# Patient Record
Sex: FEMALE | Race: WHITE | NOT HISPANIC OR LATINO | Employment: UNEMPLOYED | URBAN - METROPOLITAN AREA
[De-identification: names, ages, dates, MRNs, and addresses within clinical notes are randomized per-mention and may not be internally consistent; named-entity substitution may affect disease eponyms.]

---

## 2024-08-01 ENCOUNTER — APPOINTMENT (OUTPATIENT)
Dept: RADIOLOGY | Facility: HOSPITAL | Age: 79
End: 2024-08-01
Payer: MEDICARE

## 2024-08-01 ENCOUNTER — HOSPITAL ENCOUNTER (EMERGENCY)
Facility: HOSPITAL | Age: 79
Discharge: HOME | End: 2024-08-01
Attending: STUDENT IN AN ORGANIZED HEALTH CARE EDUCATION/TRAINING PROGRAM
Payer: MEDICARE

## 2024-08-01 ENCOUNTER — APPOINTMENT (OUTPATIENT)
Dept: CARDIOLOGY | Facility: HOSPITAL | Age: 79
End: 2024-08-01
Payer: MEDICARE

## 2024-08-01 VITALS
DIASTOLIC BLOOD PRESSURE: 93 MMHG | TEMPERATURE: 97.8 F | SYSTOLIC BLOOD PRESSURE: 146 MMHG | BODY MASS INDEX: 28.93 KG/M2 | WEIGHT: 180 LBS | HEART RATE: 80 BPM | OXYGEN SATURATION: 96 % | HEIGHT: 66 IN | RESPIRATION RATE: 23 BRPM

## 2024-08-01 DIAGNOSIS — R79.89 ELEVATED SERUM CREATININE: ICD-10-CM

## 2024-08-01 DIAGNOSIS — R55 NEAR SYNCOPE: Primary | ICD-10-CM

## 2024-08-01 LAB
ALBUMIN SERPL-MCNC: 4.3 G/DL (ref 3.5–5)
ALP BLD-CCNC: 80 U/L (ref 35–125)
ALT SERPL-CCNC: 24 U/L (ref 5–40)
ANION GAP SERPL CALC-SCNC: 11 MMOL/L
APPEARANCE UR: CLEAR
AST SERPL-CCNC: 30 U/L (ref 5–40)
BASOPHILS # BLD AUTO: 0.04 X10*3/UL (ref 0–0.1)
BASOPHILS NFR BLD AUTO: 0.8 %
BILIRUB SERPL-MCNC: 0.9 MG/DL (ref 0.1–1.2)
BILIRUB UR STRIP.AUTO-MCNC: NEGATIVE MG/DL
BUN SERPL-MCNC: 17 MG/DL (ref 8–25)
CALCIUM SERPL-MCNC: 9.2 MG/DL (ref 8.5–10.4)
CHLORIDE SERPL-SCNC: 101 MMOL/L (ref 97–107)
CO2 SERPL-SCNC: 27 MMOL/L (ref 24–31)
COLOR UR: ABNORMAL
CREAT SERPL-MCNC: 1.2 MG/DL (ref 0.4–1.6)
EGFRCR SERPLBLD CKD-EPI 2021: 46 ML/MIN/1.73M*2
EOSINOPHIL # BLD AUTO: 0.05 X10*3/UL (ref 0–0.4)
EOSINOPHIL NFR BLD AUTO: 1 %
ERYTHROCYTE [DISTWIDTH] IN BLOOD BY AUTOMATED COUNT: 14.2 % (ref 11.5–14.5)
GLUCOSE SERPL-MCNC: 125 MG/DL (ref 65–99)
GLUCOSE UR STRIP.AUTO-MCNC: ABNORMAL MG/DL
HCT VFR BLD AUTO: 45 % (ref 36–46)
HGB BLD-MCNC: 14.8 G/DL (ref 12–16)
HYALINE CASTS #/AREA URNS AUTO: ABNORMAL /LPF
IMM GRANULOCYTES # BLD AUTO: 0.01 X10*3/UL (ref 0–0.5)
IMM GRANULOCYTES NFR BLD AUTO: 0.2 % (ref 0–0.9)
KETONES UR STRIP.AUTO-MCNC: NEGATIVE MG/DL
LEUKOCYTE ESTERASE UR QL STRIP.AUTO: NEGATIVE
LYMPHOCYTES # BLD AUTO: 0.94 X10*3/UL (ref 0.8–3)
LYMPHOCYTES NFR BLD AUTO: 19.7 %
MCH RBC QN AUTO: 31.6 PG (ref 26–34)
MCHC RBC AUTO-ENTMCNC: 32.9 G/DL (ref 32–36)
MCV RBC AUTO: 96 FL (ref 80–100)
MONOCYTES # BLD AUTO: 0.34 X10*3/UL (ref 0.05–0.8)
MONOCYTES NFR BLD AUTO: 7.1 %
MUCOUS THREADS #/AREA URNS AUTO: ABNORMAL /LPF
NEUTROPHILS # BLD AUTO: 3.4 X10*3/UL (ref 1.6–5.5)
NEUTROPHILS NFR BLD AUTO: 71.2 %
NITRITE UR QL STRIP.AUTO: NEGATIVE
NRBC BLD-RTO: 0 /100 WBCS (ref 0–0)
PH UR STRIP.AUTO: 6.5 [PH]
PLATELET # BLD AUTO: 186 X10*3/UL (ref 150–450)
POTASSIUM SERPL-SCNC: 3.8 MMOL/L (ref 3.4–5.1)
PROT SERPL-MCNC: 6.9 G/DL (ref 5.9–7.9)
PROT UR STRIP.AUTO-MCNC: ABNORMAL MG/DL
Q ONSET: 227 MS
QRS COUNT: 12 BEATS
QRS DURATION: 104 MS
QT INTERVAL: 360 MS
QTC CALCULATION(BAZETT): 385 MS
QTC FREDERICIA: 377 MS
R AXIS: -25 DEGREES
RBC # BLD AUTO: 4.69 X10*6/UL (ref 4–5.2)
RBC # UR STRIP.AUTO: NEGATIVE /UL
RBC #/AREA URNS AUTO: ABNORMAL /HPF
SODIUM SERPL-SCNC: 139 MMOL/L (ref 133–145)
SP GR UR STRIP.AUTO: 1.02
SQUAMOUS #/AREA URNS AUTO: ABNORMAL /HPF
T AXIS: 34 DEGREES
T OFFSET: 407 MS
UROBILINOGEN UR STRIP.AUTO-MCNC: NORMAL MG/DL
VENTRICULAR RATE: 69 BPM
WBC # BLD AUTO: 4.8 X10*3/UL (ref 4.4–11.3)
WBC #/AREA URNS AUTO: ABNORMAL /HPF

## 2024-08-01 PROCEDURE — 99285 EMERGENCY DEPT VISIT HI MDM: CPT | Mod: 25

## 2024-08-01 PROCEDURE — 85025 COMPLETE CBC W/AUTO DIFF WBC: CPT | Performed by: STUDENT IN AN ORGANIZED HEALTH CARE EDUCATION/TRAINING PROGRAM

## 2024-08-01 PROCEDURE — 70450 CT HEAD/BRAIN W/O DYE: CPT

## 2024-08-01 PROCEDURE — 93005 ELECTROCARDIOGRAM TRACING: CPT

## 2024-08-01 PROCEDURE — 36415 COLL VENOUS BLD VENIPUNCTURE: CPT | Performed by: STUDENT IN AN ORGANIZED HEALTH CARE EDUCATION/TRAINING PROGRAM

## 2024-08-01 PROCEDURE — 80053 COMPREHEN METABOLIC PANEL: CPT | Performed by: STUDENT IN AN ORGANIZED HEALTH CARE EDUCATION/TRAINING PROGRAM

## 2024-08-01 PROCEDURE — 70450 CT HEAD/BRAIN W/O DYE: CPT | Performed by: RADIOLOGY

## 2024-08-01 PROCEDURE — 96360 HYDRATION IV INFUSION INIT: CPT

## 2024-08-01 PROCEDURE — 2500000004 HC RX 250 GENERAL PHARMACY W/ HCPCS (ALT 636 FOR OP/ED): Performed by: STUDENT IN AN ORGANIZED HEALTH CARE EDUCATION/TRAINING PROGRAM

## 2024-08-01 PROCEDURE — 81003 URINALYSIS AUTO W/O SCOPE: CPT | Performed by: STUDENT IN AN ORGANIZED HEALTH CARE EDUCATION/TRAINING PROGRAM

## 2024-08-01 RX ORDER — POTASSIUM CHLORIDE 750 MG/1
10 TABLET, FILM COATED, EXTENDED RELEASE ORAL 2 TIMES DAILY
COMMUNITY

## 2024-08-01 RX ORDER — ATORVASTATIN CALCIUM 20 MG/1
20 TABLET, FILM COATED ORAL DAILY
COMMUNITY

## 2024-08-01 RX ORDER — LEVOTHYROXINE SODIUM 25 UG/1
25 TABLET ORAL DAILY
COMMUNITY

## 2024-08-01 RX ORDER — BUSPIRONE HYDROCHLORIDE 5 MG/1
TABLET ORAL 2 TIMES DAILY
COMMUNITY

## 2024-08-01 RX ORDER — LOSARTAN POTASSIUM 50 MG/1
50 TABLET ORAL DAILY
COMMUNITY

## 2024-08-01 RX ORDER — ASPIRIN 81 MG/1
81 TABLET ORAL DAILY
COMMUNITY

## 2024-08-01 RX ORDER — SERTRALINE HYDROCHLORIDE 25 MG/1
25 TABLET, FILM COATED ORAL DAILY
COMMUNITY

## 2024-08-01 RX ORDER — FAMOTIDINE 20 MG/1
TABLET, FILM COATED ORAL
COMMUNITY

## 2024-08-01 RX ORDER — AMIODARONE HYDROCHLORIDE 200 MG/1
TABLET ORAL DAILY
COMMUNITY

## 2024-08-01 RX ORDER — SUCRALFATE 1 G/10ML
1 SUSPENSION ORAL 2 TIMES DAILY
COMMUNITY

## 2024-08-01 RX ORDER — BUSPIRONE HYDROCHLORIDE 10 MG/1
10 TABLET ORAL NIGHTLY
COMMUNITY

## 2024-08-01 RX ORDER — DONEPEZIL HYDROCHLORIDE 10 MG/1
10 TABLET, ORALLY DISINTEGRATING ORAL NIGHTLY
COMMUNITY

## 2024-08-01 RX ADMIN — SODIUM CHLORIDE 1000 ML: 900 INJECTION, SOLUTION INTRAVENOUS at 14:30

## 2024-08-01 ASSESSMENT — LIFESTYLE VARIABLES
TOTAL SCORE: 0
HAVE PEOPLE ANNOYED YOU BY CRITICIZING YOUR DRINKING: NO
HAVE YOU EVER FELT YOU SHOULD CUT DOWN ON YOUR DRINKING: NO
EVER FELT BAD OR GUILTY ABOUT YOUR DRINKING: NO
EVER HAD A DRINK FIRST THING IN THE MORNING TO STEADY YOUR NERVES TO GET RID OF A HANGOVER: NO

## 2024-08-01 ASSESSMENT — PAIN SCALES - GENERAL
PAINLEVEL_OUTOF10: 0 - NO PAIN

## 2024-08-01 ASSESSMENT — COLUMBIA-SUICIDE SEVERITY RATING SCALE - C-SSRS
1. IN THE PAST MONTH, HAVE YOU WISHED YOU WERE DEAD OR WISHED YOU COULD GO TO SLEEP AND NOT WAKE UP?: NO
2. HAVE YOU ACTUALLY HAD ANY THOUGHTS OF KILLING YOURSELF?: NO
6. HAVE YOU EVER DONE ANYTHING, STARTED TO DO ANYTHING, OR PREPARED TO DO ANYTHING TO END YOUR LIFE?: NO

## 2024-08-01 ASSESSMENT — PAIN - FUNCTIONAL ASSESSMENT: PAIN_FUNCTIONAL_ASSESSMENT: 0-10

## 2024-08-01 NOTE — DISCHARGE INSTRUCTIONS
Follow-up with your primary care physician after your emergency department visit.  Return to the ED for reevaluation in case of any new or worsening symptoms like one-sided weakness, paralysis, one-sided numbness, sudden vision loss, facial droop, slurred speech, inability to speak all of which could be signs of stroke and require immediate reassessment by physician.

## 2024-08-01 NOTE — ED TRIAGE NOTES
Patient stated patient unable to stand today, he helped to the ground. Denies any fall.  Hx Vascular dementia.

## 2024-08-01 NOTE — ED PROVIDER NOTES
HPI   Chief Complaint   Patient presents with    Gait Problem     Unable to stand up on her own.   denies any fall, slid patient to the ground.  Hx Vascular dementia       This is a 79-year-old female with a past medical history of hypothyroidism, hypertension, anxiety and depression, atrial fibrillation on amiodarone and Xarelto who presents to the ED today for evaluation of transient generalized weakness/near syncopal episode.   states that she got up in the morning as usual, she had episode where she was feeling weak and lowered herself down to the ground with his help.  She felt like she was very weak and was not strong enough to continue walking which was unusual for her.   states that her voice got very quiet when she was speaking to him during the episode.  This lasted about 30 seconds to a minute before her symptoms passed.  She denies any associated palpitations, shortness of breath, chest pain.  She does have a history of dementia.  In the ED she states she has no ongoing symptoms and feels back to normal.  Her  states that she has never had an episode like this before and wanted to have her evaluated afterwards.      History provided by:  Patient and significant other   used: No            Patient History   Past Medical History:   Diagnosis Date    Atrial fibrillation (Multi)     MI (mitral incompetence)     Vascular dementia (Multi)      Past Surgical History:   Procedure Laterality Date    ANKLE FRACTURE SURGERY Left     CATARACT EXTRACTION Bilateral     CHOLECYSTECTOMY      LASIK      RETINAL DETACHMENT SURGERY Left      No family history on file.  Social History     Tobacco Use    Smoking status: Never    Smokeless tobacco: Never   Substance Use Topics    Alcohol use: Never    Drug use: Never       Physical Exam   ED Triage Vitals [08/01/24 0954]   Temperature Heart Rate Respirations BP   36.6 °C (97.8 °F) 80 16 (!) 156/99      Pulse Ox Temp Source Heart  Rate Source Patient Position   95 % Oral Monitor Sitting      BP Location FiO2 (%)     Left arm --       Physical Exam  General: well developed, well nourished elderly female who is awake and alert, oriented to self and place but not the date or year, in no apparent distress.   at bedside.  Eyes: sclera clear bilaterally, PERRL, EOMI  HENT: normocephalic, atraumatic. Pharynx without erythema or exudates, uvula midline.  No facial droop  CV: regular rate and rhythm, no murmur, no gallops, or rubs. radial and dorsalis pedis pulses +2/4 bilaterally  Resp: clear to ascultation bilaterally, no wheezes, rales, or rhonchi  GI: abdomen soft, nontender without rigidity or guarding, no peritoneal signs, abdomen is nondistended, no masses palpated  MSK: strength +5/5 to upper and lower extremities bilaterally, no swelling of the extremities.  Neuro: no focal deficits, CN2-12 intact. Sensation fully intact.  Unable to recall the month or day, NIHSS 1.  This is baseline per .  Psych: Pleasantly demented, cooperative with exam  Skin: warm, dry, without evidence of rash or abrasions    ED Course & MDM   ED Course as of 08/01/24 1650   Thu Aug 01, 2024   1005 EKG on my interpretation shows A-fib with rate of 69 beats minute.  Normal axis.  QTc is 35 ms.  There is no ST elevation or depression, no acute ischemic pattern.  No STEMI.   [NT]      ED Course User Index  [NT] Trent Moeller DO         Diagnoses as of 08/01/24 1650   Near syncope   Elevated serum creatinine                       [place] Coma Scale Score: 15                        Medical Decision Making  She is in no acute distress in the emergency department, she is awake and alert, oriented to self and place but not the year or day,  states this is not unusual for her given her dementia and she feels that she is at her baseline mental status.  A detailed neurologic exam was performed, she has no focal neurologic deficits, NIHSS is 1 due to  inability to recall the month,  states this is normal for her.     states that she has a history of UTIs and often gets confused and weak when she has a UTI, he feels this might be the cause of her symptoms today.  General medical workup was ordered as well as EKG to evaluate for evidence of acute ischemia or arrhythmia although the patient has no ongoing chest pain.  Her EKG does show that she is in A-fib consistent with her history, there is no acute ischemic pattern.  I do not suspect ACS given this finding and lack of any ongoing chest pain or shortness of breath to suggest cardiac ischemia.  Head CT shows no acute intracranial abnormality.  There are no significant electrolyte abnormalities, her creatinine is slightly elevated compared with the baseline suggestive of dehydration.  I did order IV fluids which she received  .  On reassessment the patient states that she feels completely back to normal,  states she is acting normally and has had no recurrence of her symptoms.  She has been able to get up and ambulate to and from the bathroom and ambulate with a walker in the ED.  She typically uses a cane at home.    Given her benign workup in the ED today we discussed the option of admission for observation and MRI testing if we consider the possibility that this may have been a TIA.  Overall the patient had no focal neurologic symptoms during the episode that were consistent with TIA, I feel this is unlikely.  The patient's  states that she really would prefer to go home and she gets very anxious in the hospital, if is not absolutely necessary, they would prefer that she is not admitted.  I feel this is reasonable given that she is back to her baseline and has no ongoing symptoms, and I have low suspicion for TIA today.  She is already on aspirin, Xarelto, a statin all of which would be continued in the setting of TIA.  She does have a follow-up appointment within the next 2 weeks  with her cardiologist.  This is shared decision making.  She was discharged in improved condition with no ongoing symptoms.  Return precautions were discussed including signs of acute coronary syndrome like chest pain or shortness of breath, any new focal neurologic deficit that would suggest stroke and may require admission to the hospital for further evaluation.    CT head wo IV contrast   Final Result   No acute intracranial abnormality. Consider follow-up with MRI as   warranted.             Signed by: Constance Barreto 8/1/2024 12:16 PM   Dictation workstation:   [id number]        Labs Reviewed   COMPREHENSIVE METABOLIC PANEL - Abnormal       Result Value    Glucose 125 (*)     Sodium 139      Potassium 3.8      Chloride 101      Bicarbonate 27      Urea Nitrogen 17      Creatinine 1.20      eGFR 46 (*)     Calcium 9.2      Albumin 4.3      Alkaline Phosphatase 80      Total Protein 6.9      AST 30      Bilirubin, Total 0.9      ALT 24      Anion Gap 11     URINALYSIS WITH REFLEX CULTURE AND MICROSCOPIC - Abnormal    Color, Urine Light-Yellow      Appearance, Urine Clear      Specific Gravity, Urine 1.017      pH, Urine 6.5      Protein, Urine 10 (TRACE)      Glucose, Urine 30 (TRACE) (*)     Blood, Urine NEGATIVE      Ketones, Urine NEGATIVE      Bilirubin, Urine NEGATIVE      Urobilinogen, Urine Normal      Nitrite, Urine NEGATIVE      Leukocyte Esterase, Urine NEGATIVE     URINALYSIS MICROSCOPIC WITH REFLEX CULTURE - Abnormal    WBC, Urine 1-5      RBC, Urine 1-2      Squamous Epithelial Cells, Urine 1-9 (SPARSE)      Mucus, Urine FEW      Hyaline Casts, Urine 3+ (*)    CBC WITH AUTO DIFFERENTIAL    WBC 4.8      nRBC 0.0      RBC 4.69      Hemoglobin 14.8      Hematocrit 45.0      MCV 96      MCH 31.6      MCHC 32.9      RDW 14.2      Platelets 186      Neutrophils % 71.2      Immature Granulocytes %, Automated 0.2      Lymphocytes % 19.7      Monocytes % 7.1      Eosinophils % 1.0      Basophils % 0.8       Neutrophils Absolute 3.40      Immature Granulocytes Absolute, Automated 0.01      Lymphocytes Absolute 0.94      Monocytes Absolute 0.34      Eosinophils Absolute 0.05      Basophils Absolute 0.04     URINALYSIS WITH REFLEX CULTURE AND MICROSCOPIC    Narrative:     The following orders were created for panel order Urinalysis with Reflex Culture and Microscopic.  Procedure                               Abnormality         Status                     ---------                               -----------         ------                     Urinalysis with Reflex C...[[id number]]  Abnormal            Final result               Extra Urine Gray Tube[[id number]]                            In process                   Please view results for these tests on the individual orders.   EXTRA URINE GRAY TUBE         Procedure  Procedures     Trent Moeller,   08/01/24 3080

## 2024-08-02 LAB — HOLD SPECIMEN: NORMAL

## 2024-09-05 ENCOUNTER — APPOINTMENT (OUTPATIENT)
Dept: RADIOLOGY | Facility: HOSPITAL | Age: 79
End: 2024-09-05
Payer: MEDICARE

## 2024-09-05 ENCOUNTER — APPOINTMENT (OUTPATIENT)
Dept: CARDIOLOGY | Facility: HOSPITAL | Age: 79
End: 2024-09-05
Payer: MEDICARE

## 2024-09-05 ENCOUNTER — HOSPITAL ENCOUNTER (INPATIENT)
Facility: HOSPITAL | Age: 79
LOS: 1 days | End: 2024-09-05
Attending: EMERGENCY MEDICINE | Admitting: INTERNAL MEDICINE
Payer: MEDICARE

## 2024-09-05 VITALS
OXYGEN SATURATION: 92 % | WEIGHT: 191.14 LBS | BODY MASS INDEX: 30.72 KG/M2 | HEIGHT: 66 IN | HEART RATE: 88 BPM | DIASTOLIC BLOOD PRESSURE: 52 MMHG | SYSTOLIC BLOOD PRESSURE: 61 MMHG | TEMPERATURE: 97.9 F | RESPIRATION RATE: 18 BRPM

## 2024-09-05 DIAGNOSIS — R57.1 HYPOVOLEMIC SHOCK (MULTI): Primary | ICD-10-CM

## 2024-09-05 DIAGNOSIS — T14.8XXA HEMATOMA: ICD-10-CM

## 2024-09-05 DIAGNOSIS — R00.1 BRADYCARDIA BY ELECTROCARDIOGRAM: ICD-10-CM

## 2024-09-05 DIAGNOSIS — R55 NEAR SYNCOPE: ICD-10-CM

## 2024-09-05 DIAGNOSIS — I97.630 POSTPROCEDURAL HEMATOMA OF A CIRCULATORY SYSTEM ORGAN OR STRUCTURE FOLLOWING A CARDIAC CATHETERIZATION: ICD-10-CM

## 2024-09-05 LAB
ABO GROUP (TYPE) IN BLOOD: NORMAL
ABO GROUP (TYPE) IN BLOOD: NORMAL
ALBUMIN SERPL-MCNC: 3.5 G/DL (ref 3.5–5)
ALP BLD-CCNC: 59 U/L (ref 35–125)
ALT SERPL-CCNC: 18 U/L (ref 5–40)
ANION GAP BLDV CALCULATED.4IONS-SCNC: 19 MMOL/L (ref 10–25)
ANION GAP SERPL CALC-SCNC: 10 MMOL/L
ANTIBODY SCREEN: NORMAL
AORTIC VALVE MEAN GRADIENT: 2.5 MMHG
AORTIC VALVE PEAK VELOCITY: 1.11 M/S
APTT PPP: 30.2 SECONDS (ref 22–32.5)
AST SERPL-CCNC: 24 U/L (ref 5–40)
AV PEAK GRADIENT: 5 MMHG
AVA (PEAK VEL): 1.9 CM2
AVA (VTI): 1.9 CM2
BASE EXCESS BLDV CALC-SCNC: -16.6 MMOL/L (ref -2–3)
BASOPHILS # BLD AUTO: 0.04 X10*3/UL (ref 0–0.1)
BASOPHILS NFR BLD AUTO: 0.5 %
BILIRUB SERPL-MCNC: 0.8 MG/DL (ref 0.1–1.2)
BLOOD EXPIRATION DATE: NORMAL
BODY TEMPERATURE: 37 DEGREES CELSIUS
BUN SERPL-MCNC: 26 MG/DL (ref 8–25)
CA-I BLDV-SCNC: 1.17 MMOL/L (ref 1.1–1.33)
CALCIUM SERPL-MCNC: 8.5 MG/DL (ref 8.5–10.4)
CHLORIDE BLDV-SCNC: 107 MMOL/L (ref 98–107)
CHLORIDE SERPL-SCNC: 107 MMOL/L (ref 97–107)
CO2 SERPL-SCNC: 24 MMOL/L (ref 24–31)
CREAT SERPL-MCNC: 1.4 MG/DL (ref 0.4–1.6)
DISPENSE STATUS: NORMAL
EGFRCR SERPLBLD CKD-EPI 2021: 39 ML/MIN/1.73M*2
EJECTION FRACTION APICAL 4 CHAMBER: 39
EJECTION FRACTION: 58 %
EOSINOPHIL # BLD AUTO: 0.04 X10*3/UL (ref 0–0.4)
EOSINOPHIL NFR BLD AUTO: 0.5 %
ERYTHROCYTE [DISTWIDTH] IN BLOOD BY AUTOMATED COUNT: 14.4 % (ref 11.5–14.5)
ERYTHROCYTE [DISTWIDTH] IN BLOOD BY AUTOMATED COUNT: 17.2 % (ref 11.5–14.5)
ERYTHROCYTE [DISTWIDTH] IN BLOOD BY AUTOMATED COUNT: 18.6 % (ref 11.5–14.5)
GLUCOSE BLDV-MCNC: 307 MG/DL (ref 74–99)
GLUCOSE SERPL-MCNC: 152 MG/DL (ref 65–99)
HCO3 BLDV-SCNC: 12.1 MMOL/L (ref 22–26)
HCT VFR BLD AUTO: 24.9 % (ref 36–46)
HCT VFR BLD AUTO: 32.6 % (ref 36–46)
HCT VFR BLD AUTO: 39.2 % (ref 36–46)
HCT VFR BLD EST: 36 % (ref 36–46)
HGB BLD-MCNC: 10.5 G/DL (ref 12–16)
HGB BLD-MCNC: 12.3 G/DL (ref 12–16)
HGB BLD-MCNC: 7.9 G/DL (ref 12–16)
HGB BLDV-MCNC: 12.1 G/DL (ref 12–16)
IMM GRANULOCYTES # BLD AUTO: 0.03 X10*3/UL (ref 0–0.5)
IMM GRANULOCYTES NFR BLD AUTO: 0.3 % (ref 0–0.9)
INHALED O2 CONCENTRATION: 32 %
INR PPP: 1.4 (ref 0.9–1.2)
LACTATE BLDV-SCNC: 9.4 MMOL/L (ref 0.4–2)
LEFT VENTRICLE INTERNAL DIMENSION DIASTOLE: 4.77 CM (ref 3.5–6)
LEFT VENTRICULAR OUTFLOW TRACT DIAMETER: 1.89 CM
LV EJECTION FRACTION BIPLANE: 40 %
LYMPHOCYTES # BLD AUTO: 3.68 X10*3/UL (ref 0.8–3)
LYMPHOCYTES NFR BLD AUTO: 41.7 %
MAGNESIUM SERPL-MCNC: 2 MG/DL (ref 1.6–3.1)
MCH RBC QN AUTO: 30 PG (ref 26–34)
MCH RBC QN AUTO: 31.4 PG (ref 26–34)
MCH RBC QN AUTO: 32.2 PG (ref 26–34)
MCHC RBC AUTO-ENTMCNC: 31.4 G/DL (ref 32–36)
MCHC RBC AUTO-ENTMCNC: 31.7 G/DL (ref 32–36)
MCHC RBC AUTO-ENTMCNC: 32.2 G/DL (ref 32–36)
MCV RBC AUTO: 100 FL (ref 80–100)
MCV RBC AUTO: 100 FL (ref 80–100)
MCV RBC AUTO: 95 FL (ref 80–100)
MITRAL VALVE E/A RATIO: 2.28
MONOCYTES # BLD AUTO: 0.93 X10*3/UL (ref 0.05–0.8)
MONOCYTES NFR BLD AUTO: 10.5 %
NEUTROPHILS # BLD AUTO: 4.1 X10*3/UL (ref 1.6–5.5)
NEUTROPHILS NFR BLD AUTO: 46.5 %
NRBC BLD-RTO: 0 /100 WBCS (ref 0–0)
OXYHGB MFR BLDV: 45.3 % (ref 45–75)
PCO2 BLDV: 38 MM HG (ref 41–51)
PH BLDV: 7.11 PH (ref 7.33–7.43)
PLATELET # BLD AUTO: 107 X10*3/UL (ref 150–450)
PLATELET # BLD AUTO: 108 X10*3/UL (ref 150–450)
PLATELET # BLD AUTO: 181 X10*3/UL (ref 150–450)
PO2 BLDV: 28 MM HG (ref 35–45)
POTASSIUM BLDV-SCNC: 4.2 MMOL/L (ref 3.5–5.3)
POTASSIUM SERPL-SCNC: 3.7 MMOL/L (ref 3.4–5.1)
PRODUCT BLOOD TYPE: 5100
PRODUCT BLOOD TYPE: 9500
PRODUCT CODE: NORMAL
PROT SERPL-MCNC: 5.4 G/DL (ref 5.9–7.9)
PROTHROMBIN TIME: 14.8 SECONDS (ref 9.3–12.7)
RBC # BLD AUTO: 2.63 X10*6/UL (ref 4–5.2)
RBC # BLD AUTO: 3.26 X10*6/UL (ref 4–5.2)
RBC # BLD AUTO: 3.92 X10*6/UL (ref 4–5.2)
RH FACTOR (ANTIGEN D): NORMAL
RH FACTOR (ANTIGEN D): NORMAL
RIGHT VENTRICLE PEAK SYSTOLIC PRESSURE: 21.5 MMHG
SAO2 % BLDV: 46 % (ref 45–75)
SODIUM BLDV-SCNC: 134 MMOL/L (ref 136–145)
SODIUM SERPL-SCNC: 141 MMOL/L (ref 133–145)
UNIT ABO: NORMAL
UNIT NUMBER: NORMAL
UNIT RH: NORMAL
UNIT VOLUME: 350
WBC # BLD AUTO: 11.5 X10*3/UL (ref 4.4–11.3)
WBC # BLD AUTO: 14.7 X10*3/UL (ref 4.4–11.3)
WBC # BLD AUTO: 8.8 X10*3/UL (ref 4.4–11.3)
XM INTEP: NORMAL

## 2024-09-05 PROCEDURE — 2500000004 HC RX 250 GENERAL PHARMACY W/ HCPCS (ALT 636 FOR OP/ED): Performed by: PHYSICIAN ASSISTANT

## 2024-09-05 PROCEDURE — 71045 X-RAY EXAM CHEST 1 VIEW: CPT

## 2024-09-05 PROCEDURE — 96374 THER/PROPH/DIAG INJ IV PUSH: CPT | Mod: 59

## 2024-09-05 PROCEDURE — 84132 ASSAY OF SERUM POTASSIUM: CPT | Performed by: NURSE PRACTITIONER

## 2024-09-05 PROCEDURE — 3E033XZ INTRODUCTION OF VASOPRESSOR INTO PERIPHERAL VEIN, PERCUTANEOUS APPROACH: ICD-10-PCS | Performed by: INTERNAL MEDICINE

## 2024-09-05 PROCEDURE — 82435 ASSAY OF BLOOD CHLORIDE: CPT | Performed by: NURSE PRACTITIONER

## 2024-09-05 PROCEDURE — 99223 1ST HOSP IP/OBS HIGH 75: CPT | Performed by: NURSE PRACTITIONER

## 2024-09-05 PROCEDURE — 71045 X-RAY EXAM CHEST 1 VIEW: CPT | Performed by: RADIOLOGY

## 2024-09-05 PROCEDURE — P9016 RBC LEUKOCYTES REDUCED: HCPCS

## 2024-09-05 PROCEDURE — 2550000001 HC RX 255 CONTRASTS: Performed by: EMERGENCY MEDICINE

## 2024-09-05 PROCEDURE — 85025 COMPLETE CBC W/AUTO DIFF WBC: CPT | Performed by: PHYSICIAN ASSISTANT

## 2024-09-05 PROCEDURE — 2500000004 HC RX 250 GENERAL PHARMACY W/ HCPCS (ALT 636 FOR OP/ED): Performed by: NURSE PRACTITIONER

## 2024-09-05 PROCEDURE — 6360000002 HC RX 636 FACTOR: Mod: JZ | Performed by: EMERGENCY MEDICINE

## 2024-09-05 PROCEDURE — 93306 TTE W/DOPPLER COMPLETE: CPT | Performed by: INTERNAL MEDICINE

## 2024-09-05 PROCEDURE — 99291 CRITICAL CARE FIRST HOUR: CPT | Performed by: NURSE PRACTITIONER

## 2024-09-05 PROCEDURE — 86920 COMPATIBILITY TEST SPIN: CPT

## 2024-09-05 PROCEDURE — 93005 ELECTROCARDIOGRAM TRACING: CPT

## 2024-09-05 PROCEDURE — 75635 CT ANGIO ABDOMINAL ARTERIES: CPT

## 2024-09-05 PROCEDURE — 99238 HOSP IP/OBS DSCHRG MGMT 30/<: CPT

## 2024-09-05 PROCEDURE — 85610 PROTHROMBIN TIME: CPT | Performed by: NURSE PRACTITIONER

## 2024-09-05 PROCEDURE — 99291 CRITICAL CARE FIRST HOUR: CPT | Mod: 25

## 2024-09-05 PROCEDURE — 96361 HYDRATE IV INFUSION ADD-ON: CPT | Mod: 59

## 2024-09-05 PROCEDURE — 83735 ASSAY OF MAGNESIUM: CPT | Performed by: PHYSICIAN ASSISTANT

## 2024-09-05 PROCEDURE — 2500000005 HC RX 250 GENERAL PHARMACY W/O HCPCS: Performed by: NURSE PRACTITIONER

## 2024-09-05 PROCEDURE — 96365 THER/PROPH/DIAG IV INF INIT: CPT | Mod: 59

## 2024-09-05 PROCEDURE — 93306 TTE W/DOPPLER COMPLETE: CPT

## 2024-09-05 PROCEDURE — 02HV33Z INSERTION OF INFUSION DEVICE INTO SUPERIOR VENA CAVA, PERCUTANEOUS APPROACH: ICD-10-PCS | Performed by: EMERGENCY MEDICINE

## 2024-09-05 PROCEDURE — 96375 TX/PRO/DX INJ NEW DRUG ADDON: CPT | Mod: 59

## 2024-09-05 PROCEDURE — 36430 TRANSFUSION BLD/BLD COMPNT: CPT

## 2024-09-05 PROCEDURE — 2020000001 HC ICU ROOM DAILY

## 2024-09-05 PROCEDURE — 86901 BLOOD TYPING SEROLOGIC RH(D): CPT | Performed by: PHYSICIAN ASSISTANT

## 2024-09-05 PROCEDURE — 99222 1ST HOSP IP/OBS MODERATE 55: CPT | Performed by: NURSE PRACTITIONER

## 2024-09-05 PROCEDURE — 36415 COLL VENOUS BLD VENIPUNCTURE: CPT | Performed by: PHYSICIAN ASSISTANT

## 2024-09-05 PROCEDURE — 36556 INSERT NON-TUNNEL CV CATH: CPT | Performed by: EMERGENCY MEDICINE

## 2024-09-05 PROCEDURE — 2500000005 HC RX 250 GENERAL PHARMACY W/O HCPCS: Performed by: EMERGENCY MEDICINE

## 2024-09-05 PROCEDURE — 36415 COLL VENOUS BLD VENIPUNCTURE: CPT | Performed by: EMERGENCY MEDICINE

## 2024-09-05 PROCEDURE — 80053 COMPREHEN METABOLIC PANEL: CPT | Performed by: PHYSICIAN ASSISTANT

## 2024-09-05 PROCEDURE — P9040 RBC LEUKOREDUCED IRRADIATED: HCPCS

## 2024-09-05 PROCEDURE — 2500000004 HC RX 250 GENERAL PHARMACY W/ HCPCS (ALT 636 FOR OP/ED)

## 2024-09-05 PROCEDURE — 85027 COMPLETE CBC AUTOMATED: CPT | Performed by: NURSE PRACTITIONER

## 2024-09-05 PROCEDURE — 75635 CT ANGIO ABDOMINAL ARTERIES: CPT | Performed by: RADIOLOGY

## 2024-09-05 PROCEDURE — 2500000004 HC RX 250 GENERAL PHARMACY W/ HCPCS (ALT 636 FOR OP/ED): Performed by: EMERGENCY MEDICINE

## 2024-09-05 RX ORDER — LEVOTHYROXINE SODIUM 25 UG/1
25 TABLET ORAL DAILY
Status: DISCONTINUED | OUTPATIENT
Start: 2024-09-06 | End: 2024-09-05

## 2024-09-05 RX ORDER — LIDOCAINE HYDROCHLORIDE 20 MG/ML
5 INJECTION, SOLUTION INFILTRATION; PERINEURAL ONCE
Status: COMPLETED | OUTPATIENT
Start: 2024-09-05 | End: 2024-09-05

## 2024-09-05 RX ORDER — LORAZEPAM 2 MG/ML
2 INJECTION INTRAMUSCULAR EVERY 10 MIN PRN
Status: DISCONTINUED | OUTPATIENT
Start: 2024-09-05 | End: 2024-09-06 | Stop reason: HOSPADM

## 2024-09-05 RX ORDER — BUSPIRONE HYDROCHLORIDE 5 MG/1
5 TABLET ORAL 2 TIMES DAILY
Status: DISCONTINUED | OUTPATIENT
Start: 2024-09-05 | End: 2024-09-05

## 2024-09-05 RX ORDER — ONDANSETRON HYDROCHLORIDE 2 MG/ML
4 INJECTION, SOLUTION INTRAVENOUS ONCE
Status: COMPLETED | OUTPATIENT
Start: 2024-09-05 | End: 2024-09-05

## 2024-09-05 RX ORDER — LORAZEPAM 2 MG/ML
0.5 INJECTION INTRAMUSCULAR EVERY 4 HOURS PRN
Status: DISCONTINUED | OUTPATIENT
Start: 2024-09-05 | End: 2024-09-06 | Stop reason: HOSPADM

## 2024-09-05 RX ORDER — EPINEPHRINE HCL IN DEXTROSE 5% 4MG/250ML
0-.2 PLASTIC BAG, INJECTION (ML) INTRAVENOUS CONTINUOUS
Status: DISCONTINUED | OUTPATIENT
Start: 2024-09-05 | End: 2024-09-05

## 2024-09-05 RX ORDER — ATROPINE SULFATE 1 MG/ML
0.5 INJECTION, SOLUTION INTRAVENOUS ONCE
Status: COMPLETED | OUTPATIENT
Start: 2024-09-05 | End: 2024-09-05

## 2024-09-05 RX ORDER — KETOROLAC TROMETHAMINE 30 MG/ML
15 INJECTION, SOLUTION INTRAMUSCULAR; INTRAVENOUS EVERY 6 HOURS PRN
Status: DISCONTINUED | OUTPATIENT
Start: 2024-09-05 | End: 2024-09-06 | Stop reason: HOSPADM

## 2024-09-05 RX ORDER — ASPIRIN 81 MG/1
81 TABLET ORAL DAILY
Status: DISCONTINUED | OUTPATIENT
Start: 2024-09-05 | End: 2024-09-05

## 2024-09-05 RX ORDER — ATORVASTATIN CALCIUM 20 MG/1
20 TABLET, FILM COATED ORAL NIGHTLY
Status: DISCONTINUED | OUTPATIENT
Start: 2024-09-05 | End: 2024-09-05

## 2024-09-05 RX ORDER — EPINEPHRINE HCL IN DEXTROSE 5% 4MG/250ML
0-.2 PLASTIC BAG, INJECTION (ML) INTRAVENOUS CONTINUOUS
Status: DISCONTINUED | OUTPATIENT
Start: 2024-09-05 | End: 2024-09-06 | Stop reason: HOSPADM

## 2024-09-05 RX ORDER — MORPHINE SULFATE 2 MG/ML
2 INJECTION, SOLUTION INTRAMUSCULAR; INTRAVENOUS
Status: DISCONTINUED | OUTPATIENT
Start: 2024-09-05 | End: 2024-09-06 | Stop reason: HOSPADM

## 2024-09-05 RX ORDER — GLYCOPYRROLATE 0.2 MG/ML
0.2 INJECTION INTRAMUSCULAR; INTRAVENOUS EVERY 4 HOURS PRN
Status: DISCONTINUED | OUTPATIENT
Start: 2024-09-05 | End: 2024-09-06 | Stop reason: HOSPADM

## 2024-09-05 RX ORDER — DEXTROSE 50 % IN WATER (D50W) INTRAVENOUS SYRINGE
12.5
Status: DISCONTINUED | OUTPATIENT
Start: 2024-09-05 | End: 2024-09-05

## 2024-09-05 RX ORDER — SUCRALFATE 1 G/10ML
1 SUSPENSION ORAL 2 TIMES DAILY
Status: DISCONTINUED | OUTPATIENT
Start: 2024-09-05 | End: 2024-09-05

## 2024-09-05 RX ORDER — HALOPERIDOL 5 MG/ML
1 INJECTION INTRAMUSCULAR EVERY 4 HOURS PRN
Status: DISCONTINUED | OUTPATIENT
Start: 2024-09-05 | End: 2024-09-06 | Stop reason: HOSPADM

## 2024-09-05 RX ORDER — POTASSIUM CHLORIDE 750 MG/1
10 TABLET, FILM COATED, EXTENDED RELEASE ORAL 2 TIMES DAILY
Status: DISCONTINUED | OUTPATIENT
Start: 2024-09-05 | End: 2024-09-06 | Stop reason: HOSPADM

## 2024-09-05 RX ORDER — MORPHINE SULFATE 4 MG/ML
4 INJECTION, SOLUTION INTRAMUSCULAR; INTRAVENOUS
Status: DISCONTINUED | OUTPATIENT
Start: 2024-09-05 | End: 2024-09-06 | Stop reason: HOSPADM

## 2024-09-05 RX ORDER — DONEPEZIL HYDROCHLORIDE 10 MG/1
10 TABLET, FILM COATED ORAL NIGHTLY
Status: DISCONTINUED | OUTPATIENT
Start: 2024-09-05 | End: 2024-09-05

## 2024-09-05 RX ORDER — LOSARTAN POTASSIUM 50 MG/1
50 TABLET ORAL DAILY
Status: DISCONTINUED | OUTPATIENT
Start: 2024-09-05 | End: 2024-09-05

## 2024-09-05 RX ORDER — KETAMINE HYDROCHLORIDE 50 MG/ML
0.25 INJECTION, SOLUTION INTRAMUSCULAR; INTRAVENOUS ONCE
Status: COMPLETED | OUTPATIENT
Start: 2024-09-05 | End: 2024-09-05

## 2024-09-05 RX ORDER — SERTRALINE HYDROCHLORIDE 50 MG/1
25 TABLET, FILM COATED ORAL DAILY
Status: DISCONTINUED | OUTPATIENT
Start: 2024-09-05 | End: 2024-09-05

## 2024-09-05 RX ORDER — FAMOTIDINE 20 MG/1
20 TABLET, FILM COATED ORAL DAILY
Status: DISCONTINUED | OUTPATIENT
Start: 2024-09-05 | End: 2024-09-05

## 2024-09-05 RX ORDER — BUSPIRONE HYDROCHLORIDE 10 MG/1
10 TABLET ORAL NIGHTLY
Status: DISCONTINUED | OUTPATIENT
Start: 2024-09-05 | End: 2024-09-05

## 2024-09-05 RX ORDER — INSULIN LISPRO 100 [IU]/ML
0-5 INJECTION, SOLUTION INTRAVENOUS; SUBCUTANEOUS
Status: DISCONTINUED | OUTPATIENT
Start: 2024-09-05 | End: 2024-09-05

## 2024-09-05 RX ORDER — SUCRALFATE 1 G/1
1 TABLET ORAL 2 TIMES DAILY
COMMUNITY
End: 2024-09-05 | Stop reason: HOSPADM

## 2024-09-05 RX ORDER — CHOLECALCIFEROL (VITAMIN D3) 125 MCG
125 CAPSULE ORAL DAILY
COMMUNITY
End: 2024-09-05 | Stop reason: HOSPADM

## 2024-09-05 RX ORDER — DEXTROSE 50 % IN WATER (D50W) INTRAVENOUS SYRINGE
25
Status: DISCONTINUED | OUTPATIENT
Start: 2024-09-05 | End: 2024-09-05

## 2024-09-05 RX ORDER — MORPHINE SULFATE/0.9% NACL/PF 1 MG/ML
0-10 PLASTIC BAG, INJECTION (ML) INTRAVENOUS CONTINUOUS
Status: DISCONTINUED | OUTPATIENT
Start: 2024-09-05 | End: 2024-09-06 | Stop reason: HOSPADM

## 2024-09-05 RX ORDER — AMIODARONE HYDROCHLORIDE 200 MG/1
200 TABLET ORAL DAILY
Status: DISCONTINUED | OUTPATIENT
Start: 2024-09-05 | End: 2024-09-05

## 2024-09-05 RX ORDER — PROCHLORPERAZINE EDISYLATE 5 MG/ML
10 INJECTION INTRAMUSCULAR; INTRAVENOUS ONCE
Status: COMPLETED | OUTPATIENT
Start: 2024-09-05 | End: 2024-09-05

## 2024-09-05 SDOH — SOCIAL STABILITY: SOCIAL INSECURITY: ARE YOU OR HAVE YOU BEEN THREATENED OR ABUSED PHYSICALLY, EMOTIONALLY, OR SEXUALLY BY ANYONE?: UNABLE TO ASSESS

## 2024-09-05 SDOH — SOCIAL STABILITY: SOCIAL INSECURITY: ABUSE: ADULT

## 2024-09-05 SDOH — SOCIAL STABILITY: SOCIAL INSECURITY: WERE YOU ABLE TO COMPLETE ALL THE BEHAVIORAL HEALTH SCREENINGS?: NO

## 2024-09-05 SDOH — SOCIAL STABILITY: SOCIAL INSECURITY: HAS ANYONE EVER THREATENED TO HURT YOUR FAMILY OR YOUR PETS?: UNABLE TO ASSESS

## 2024-09-05 SDOH — SOCIAL STABILITY: SOCIAL INSECURITY: HAVE YOU HAD THOUGHTS OF HARMING ANYONE ELSE?: UNABLE TO ASSESS

## 2024-09-05 SDOH — SOCIAL STABILITY: SOCIAL INSECURITY: DO YOU FEEL ANYONE HAS EXPLOITED OR TAKEN ADVANTAGE OF YOU FINANCIALLY OR OF YOUR PERSONAL PROPERTY?: UNABLE TO ASSESS

## 2024-09-05 SDOH — SOCIAL STABILITY: SOCIAL INSECURITY: DOES ANYONE TRY TO KEEP YOU FROM HAVING/CONTACTING OTHER FRIENDS OR DOING THINGS OUTSIDE YOUR HOME?: UNABLE TO ASSESS

## 2024-09-05 SDOH — SOCIAL STABILITY: SOCIAL INSECURITY: HAVE YOU HAD ANY THOUGHTS OF HARMING ANYONE ELSE?: UNABLE TO ASSESS

## 2024-09-05 SDOH — SOCIAL STABILITY: SOCIAL INSECURITY: ARE THERE ANY APPARENT SIGNS OF INJURIES/BEHAVIORS THAT COULD BE RELATED TO ABUSE/NEGLECT?: UNABLE TO ASSESS

## 2024-09-05 SDOH — SOCIAL STABILITY: SOCIAL INSECURITY: DO YOU FEEL UNSAFE GOING BACK TO THE PLACE WHERE YOU ARE LIVING?: UNABLE TO ASSESS

## 2024-09-05 SDOH — HEALTH STABILITY: MENTAL HEALTH: EXPERIENCED ANY OF THE FOLLOWING LIFE EVENTS: OTHER (COMMENT)

## 2024-09-05 ASSESSMENT — COGNITIVE AND FUNCTIONAL STATUS - GENERAL
CLIMB 3 TO 5 STEPS WITH RAILING: A LITTLE
PATIENT BASELINE BEDBOUND: NO
DAILY ACTIVITIY SCORE: 24
MOBILITY SCORE: 23

## 2024-09-05 ASSESSMENT — ACTIVITIES OF DAILY LIVING (ADL)
BATHING: INDEPENDENT
JUDGMENT_ADEQUATE_SAFELY_COMPLETE_DAILY_ACTIVITIES: NO
HEARING - RIGHT EAR: FUNCTIONAL
PATIENT'S MEMORY ADEQUATE TO SAFELY COMPLETE DAILY ACTIVITIES?: NO
WALKS IN HOME: INDEPENDENT
ADEQUATE_TO_COMPLETE_ADL: YES
TOILETING: INDEPENDENT
GROOMING: INDEPENDENT
LACK_OF_TRANSPORTATION: NO
FEEDING YOURSELF: INDEPENDENT
DRESSING YOURSELF: INDEPENDENT
HEARING - LEFT EAR: FUNCTIONAL

## 2024-09-05 ASSESSMENT — PAIN SCALES - GENERAL
PAINLEVEL_OUTOF10: 10 - WORST POSSIBLE PAIN
PAINLEVEL_OUTOF10: 7

## 2024-09-05 ASSESSMENT — COLUMBIA-SUICIDE SEVERITY RATING SCALE - C-SSRS
6. HAVE YOU EVER DONE ANYTHING, STARTED TO DO ANYTHING, OR PREPARED TO DO ANYTHING TO END YOUR LIFE?: NO
1. IN THE PAST MONTH, HAVE YOU WISHED YOU WERE DEAD OR WISHED YOU COULD GO TO SLEEP AND NOT WAKE UP?: NO
2. HAVE YOU ACTUALLY HAD ANY THOUGHTS OF KILLING YOURSELF?: NO

## 2024-09-05 ASSESSMENT — PAIN SCALES - PAIN ASSESSMENT IN ADVANCED DEMENTIA (PAINAD)
TOTALSCORE: 0
TOTALSCORE: EFFECTIVE
BREATHING: NORMAL
TOTALSCORE: MEDICATION (SEE MAR)
CONSOLABILITY: UNABLE TO CONSOLE, DISTRACT OR REASSURE
TOTALSCORE: 0
CONSOLABILITY: NO NEED TO CONSOLE
TOTALSCORE: 7
BREATHING: NOISY LABORED BREATHING, LONG PERIODS OF HYPERVENTILATION, CHEYNE-STOKES RESPIRATIONS
TOTALSCORE: EFFECTIVE
FACIALEXPRESSION: SMILING OR INEXPRESSIVE
BODYLANGUAGE: TENSE, DISTRESSED PACING, FIDGETING
BODYLANGUAGE: RELAXED
BREATHING: NORMAL
BODYLANGUAGE: RELAXED
CONSOLABILITY: NO NEED TO CONSOLE
FACIALEXPRESSION: SMILING OR INEXPRESSIVE
CONSOLABILITY: NO NEED TO CONSOLE
TOTALSCORE: MEDICATION (SEE MAR)
NEGVOCALIZATION: OCCASIONAL MOAN/GROAN, LOW SPEECH, NEGATIVE/DISAPPROVING QUALITY
BODYLANGUAGE: RELAXED
FACIALEXPRESSION: SAD, FRIGHTENED, FROWN
TOTALSCORE: 0
FACIALEXPRESSION: SMILING OR INEXPRESSIVE
BREATHING: NORMAL

## 2024-09-05 ASSESSMENT — LIFESTYLE VARIABLES
HOW OFTEN DO YOU HAVE A DRINK CONTAINING ALCOHOL: PATIENT UNABLE TO ANSWER
HOW MANY STANDARD DRINKS CONTAINING ALCOHOL DO YOU HAVE ON A TYPICAL DAY: PATIENT UNABLE TO ANSWER
EVER FELT BAD OR GUILTY ABOUT YOUR DRINKING: NO
AUDIT-C TOTAL SCORE: -1
HOW OFTEN DO YOU HAVE 6 OR MORE DRINKS ON ONE OCCASION: PATIENT UNABLE TO ANSWER
SKIP TO QUESTIONS 9-10: 0
EVER HAD A DRINK FIRST THING IN THE MORNING TO STEADY YOUR NERVES TO GET RID OF A HANGOVER: NO
HAVE PEOPLE ANNOYED YOU BY CRITICIZING YOUR DRINKING: NO
HAVE YOU EVER FELT YOU SHOULD CUT DOWN ON YOUR DRINKING: NO
AUDIT-C TOTAL SCORE: -1
TOTAL SCORE: 0

## 2024-09-05 ASSESSMENT — PATIENT HEALTH QUESTIONNAIRE - PHQ9
SUM OF ALL RESPONSES TO PHQ9 QUESTIONS 1 & 2: 0
1. LITTLE INTEREST OR PLEASURE IN DOING THINGS: NOT AT ALL
2. FEELING DOWN, DEPRESSED OR HOPELESS: NOT AT ALL

## 2024-09-05 ASSESSMENT — PAIN - FUNCTIONAL ASSESSMENT: PAIN_FUNCTIONAL_ASSESSMENT: PAINAD (PAIN ASSESSMENT IN ADVANCED DEMENTIA SCALE)

## 2024-09-05 NOTE — CONSULTS
Inpatient consult to Cardiology  Consult performed by: ROMAIN Barnett-CNP  Consult ordered by: Eneida Dozier DO  Reason for consult: Bradycardia, status post ablation groin hematoma        History Of Present Illness:    Maria De Jesus Castaneda is a 79 y.o. female presenting with weakness, fatigue, sudden onset of left groin hematoma.  Current with [facility] cardiology.  Patient underwent her third cardiac ablation 2 days ago.  She had been doing well post procedure.  On a previous procedure she did have issues with groin bleeding which caused her to spend 1 extra night in the hospital.  She was at home this morning and just got the bathroom when she had a sudden onset of expanding hematoma in her left groin.  This got progressively worse which was accompanied by dizziness and weakness prompting emergent transfer to the hospital.  EKG in emergency department a sinus bradycardia without acute ST ovation T wave inversion.  She was noted to be significant hypotensive in the emergency department at 78/52.  Suspected arterial bleed.  Pressure held.  Patient received atropine with improvement of heart rate.  Received Kcentra as well as packed red blood cells.   vascular services and our service were consulted for management of acute decompensated state.      Last Recorded Vitals:  Vitals:    09/05/24 1141 09/05/24 1145 09/05/24 1150 09/05/24 1155   BP: 73/59 87/55 85/50 78/52   Pulse: 61 59 58 54   Resp: 14 12 19 16   Temp: 35.5 °C (95.9 °F)   35.3 °C (95.5 °F)   TempSrc:  Oral     SpO2:  95%     Weight:       Height:           Last Labs:  CBC - 9/5/2024: 11:13 AM  8.8 10.5 181    32.6      CMP - 9/5/2024: 11:13 AM  8.5 5.4 24 --- 0.8   _ 3.5 18 59      PTT - No results in last year.  _   _ _     Hemoglobin A1C   Date/Time Value Ref Range Status   01/27/2021 11:21 AM 5.5 4.3 - 5.6 % Final     Comment:     American Diabetes Association guidelines indicate that patients with HgbA1c in   the range 5.7-6.4% are at  increased risk for development of diabetes, and   intervention by lifestyle modification may be beneficial. HgbA1c greater or   equal to 6.5% is considered diagnostic of diabetes.      Results for orders placed or performed during the hospital encounter of 09/05/24 (from the past 24 hour(s))   CBC and Auto Differential   Result Value Ref Range    WBC 8.8 4.4 - 11.3 x10*3/uL    nRBC 0.0 0.0 - 0.0 /100 WBCs    RBC 3.26 (L) 4.00 - 5.20 x10*6/uL    Hemoglobin 10.5 (L) 12.0 - 16.0 g/dL    Hematocrit 32.6 (L) 36.0 - 46.0 %     80 - 100 fL    MCH 32.2 26.0 - 34.0 pg    MCHC 32.2 32.0 - 36.0 g/dL    RDW 14.4 11.5 - 14.5 %    Platelets 181 150 - 450 x10*3/uL    Neutrophils % 46.5 40.0 - 80.0 %    Immature Granulocytes %, Automated 0.3 0.0 - 0.9 %    Lymphocytes % 41.7 13.0 - 44.0 %    Monocytes % 10.5 2.0 - 10.0 %    Eosinophils % 0.5 0.0 - 6.0 %    Basophils % 0.5 0.0 - 2.0 %    Neutrophils Absolute 4.10 1.60 - 5.50 x10*3/uL    Immature Granulocytes Absolute, Automated 0.03 0.00 - 0.50 x10*3/uL    Lymphocytes Absolute 3.68 (H) 0.80 - 3.00 x10*3/uL    Monocytes Absolute 0.93 (H) 0.05 - 0.80 x10*3/uL    Eosinophils Absolute 0.04 0.00 - 0.40 x10*3/uL    Basophils Absolute 0.04 0.00 - 0.10 x10*3/uL   Comprehensive Metabolic Panel   Result Value Ref Range    Glucose 152 (H) 65 - 99 mg/dL    Sodium 141 133 - 145 mmol/L    Potassium 3.7 3.4 - 5.1 mmol/L    Chloride 107 97 - 107 mmol/L    Bicarbonate 24 24 - 31 mmol/L    Urea Nitrogen 26 (H) 8 - 25 mg/dL    Creatinine 1.40 0.40 - 1.60 mg/dL    eGFR 39 (L) >60 mL/min/1.73m*2    Calcium 8.5 8.5 - 10.4 mg/dL    Albumin 3.5 3.5 - 5.0 g/dL    Alkaline Phosphatase 59 35 - 125 U/L    Total Protein 5.4 (L) 5.9 - 7.9 g/dL    AST 24 5 - 40 U/L    Bilirubin, Total 0.8 0.1 - 1.2 mg/dL    ALT 18 5 - 40 U/L    Anion Gap 10 <=19 mmol/L   Magnesium   Result Value Ref Range    Magnesium 2.00 1.60 - 3.10 mg/dL   Type and screen   Result Value Ref Range    ABO TYPE A     Rh TYPE POS      ANTIBODY SCREEN NEG    VERIFY ABO/Rh Group Test   Result Value Ref Range    ABO TYPE A     Rh TYPE POS    Prepare RBC   Result Value Ref Range    PRODUCT CODE G5510D21     Unit Number [id number]     Unit ABO O     Unit RH NEG     Dispense Status EI     Blood Expiration Date 2024 11:59:00 PM EDT     PRODUCT BLOOD TYPE 9500     UNIT VOLUME 350    Prepare RBC   Result Value Ref Range    PRODUCT CODE W9151G81     Unit Number [id number]     Unit ABO O     Unit RH NEG     Dispense Status EI     Blood Expiration Date 2024 11:59:00 PM EDT     PRODUCT BLOOD TYPE 9500     UNIT VOLUME 350        Last I/O:  No intake/output data recorded.    Past Cardiology Tests (Last 3 Years):  EK24: Sinus bradycardia      Past Medical History:  She has a past medical history of Atrial fibrillation (Multi), MI (mitral incompetence), and Vascular dementia (Multi).    Past Surgical History:  She has a past surgical history that includes LASIK; Cataract extraction (Bilateral); Retinal detachment surgery (Left); Cholecystectomy; and Ankle fracture surgery (Left).  Cardiac ablation x 3      Social History:  She reports that she has never smoked. She has never used smokeless tobacco. She reports that she does not drink alcohol and does not use drugs.    Family History:  No family history on file.     Allergies:  Lisinopril and Shellfish derived    Inpatient Medications:  Scheduled medications   Medication Dose Route Frequency    iohexol  75 mL intravenous Once in imaging    perflutren lipid microspheres  0.5-10 mL of dilution intravenous Once in imaging     PRN medications   Medication     Continuous Medications   Medication Dose Last Rate    EPINEPHrine  0-0.2 mcg/kg/min       Outpatient Medications:  Current Outpatient Medications   Medication Instructions    amiodarone (Pacerone) 200 mg tablet oral, Daily    aspirin 81 mg, oral, Daily    atorvastatin (LIPITOR) 20 mg, oral, Daily    busPIRone (Buspar) 5 mg tablet oral,  2 times daily    busPIRone (BUSPAR) 10 mg, oral, Nightly    donepezil (ARICEPT ODT) 10 mg, oral, Nightly    famotidine (Pepcid) 20 mg tablet oral    levothyroxine (SYNTHROID, LEVOXYL) 25 mcg, oral, Daily, Take on an empty stomach at the same time each day, either 30 to 60 minutes prior to breakfast    losartan (COZAAR) 50 mg, oral, Daily    potassium chloride CR 10 mEq ER tablet 10 mEq, oral, 2 times daily, Do not crush, chew, or split.    rivaroxaban (Xarelto) 20 mg tablet oral, Take with food.    sertraline (ZOLOFT) 25 mg, oral, Daily    sucralfate (CARAFATE) 1 g, oral, 2 times daily       Physical Exam:  General: alert, orie oriented x 3, pleasant, pale,  at bedside  HEENT: normal cephalic, atraumatic, no scleral icterus  Neck: No JVD, bruit or thrill, masses or tenderness   Heart: S1/S2, Rate 50, Rhythm regular, no s3 or s4, no murmur, thrill, or heaves at PMI.   Lungs: Clear, equal expansion and excursion, no wheezes, crackles, rhales or rhonci.  Room air.  No conversational dyspnea appreciated.  Tachypnea.  No pain with inspiration  Abdomen: bowel sounds x 4, soft, non-tender to light and deep palpation, No masses, guarding, or CVA tenderness   Genitourinary: deferred   Extremities: Large left groin hematoma noted.  Painful to palpation     Assessment/Plan     Suspected status post arterial puncture left groin hemorrhage  Status post cardiac ablation  Acute hypotension  Acute bradycardia  Paroxysmal atrial fibrillation  Anemia    Overall impression:    9/5: As above, suspected status post arterial puncture hemorrhage to left groin.  Patient is going for stat CT groin at this time.  Vascular is assessing patient.  From our perspective she is hypotensive and bradycardic.  Cardiac noises distant.  I have ordered for a stat bedside echocardiogram. Patient digitally received IV atropine with improvement in heart rate.  Agree with IV epinephrine drip.   She is receiving Kcentra as she does take rivaroxaban  for her paroxysmal atrial fibrillation.  Additionally she is receiving IV fluids and packed red blood cells.  Pressure has been applied to her left groin site.  The patient is chest pain-free and euvolemic on examination otherwise.  Await results of CT and echocardiogram.  Will follow closely with you.  I discussed this case thoroughly with Dr. Washington who has assessed the patient as well.       Code Status:  No Order    I spent 60 minutes in the professional and overall care of this patient.        Ace De Jesus, APRN-CNP

## 2024-09-05 NOTE — ED PROCEDURE NOTE
Procedure  Central Line    Performed by: Eneida Dozier DO  Authorized by: Eneida Dozier DO    Consent:     Consent obtained:  Verbal    Consent given by:  Spouse and healthcare agent    Risks discussed:  Incorrect placement, pneumothorax, infection and bleeding    Alternatives discussed:  No treatment  Universal protocol:     Procedure explained and questions answered to patient or proxy's satisfaction: yes      Required blood products, implants, devices, and special equipment available: yes    Pre-procedure details:     Indication(s): central venous access and hemodynamic monitoring      Hand hygiene: Hand hygiene performed prior to insertion      Sterile barrier technique: All elements of maximal sterile technique followed      Skin preparation:  Chlorhexidine (x3)    Skin preparation agent: Skin preparation agent completely dried prior to procedure    Anesthesia:     Anesthesia method:  Local infiltration    Local anesthetic:  Lidocaine 1% w/o epi  Procedure details:     Location:  R internal jugular    Patient position:  Trendelenburg    Procedural supplies:  Triple lumen    Catheter size:  7 Fr    Landmarks identified: yes      Ultrasound guidance: yes      Ultrasound guidance timing: real time      Sterile ultrasound techniques: Sterile gel and sterile probe covers were used      Number of attempts:  1    Successful placement: yes    Post-procedure details:     Post-procedure:  Dressing applied and line sutured    Assessment:  Blood return through all ports, free fluid flow, no pneumothorax on x-ray and placement verified by x-ray    Procedure completion:  Tolerated well, no immediate complications               Eneida Dozier DO  09/05/24 8281

## 2024-09-05 NOTE — H&P
ICU History & Physical    Subjective   HPI:  79 year old female with a history of CAD, hypertension, hyperlipidemia, PAF, dementia, status post NSTEMI 3/10 secondary occluded high rising first diagonal branch, who underwent femoral accessed cardiac ablation 9/3/24 at [facility] and was discharged to home.  She reported new thigh swelling to her  this morning; she then had syncope x 2 at home and he activated EMS who noted her to be bradycardiac, hypotensive, and with an obviously enlarging left thigh hematoma.  The hematoma continued to enalrge in the ED where she was given 2 units uncrossed units of pRBC as well as atropine and an epinephrine infusion for mixed shock (suspect myocardial hypoperfusion in setting of CAD and hemorrhagic shock).      Hemodynamics improved with blood and IVF in the ED.  She was evaluated by Vascular Surgery as well as Cardiology in the ED and is now admitted to [facility] ICU for further resuscitation and management of primary hemorrhagic shock and secondary cariogenic shock.                  Past Medical History:   Diagnosis Date    Atrial fibrillation (Multi)     MI (mitral incompetence)     Vascular dementia (Multi)      Past Surgical History:   Procedure Laterality Date    ANKLE FRACTURE SURGERY Left     CATARACT EXTRACTION Bilateral     CHOLECYSTECTOMY      LASIK      RETINAL DETACHMENT SURGERY Left      Medications Prior to Admission   Medication Sig Dispense Refill Last Dose    amiodarone (Pacerone) 200 mg tablet Take by mouth once daily.       aspirin 81 mg EC tablet Take 1 tablet (81 mg) by mouth once daily.       atorvastatin (Lipitor) 20 mg tablet Take 1 tablet (20 mg) by mouth once daily.       busPIRone (Buspar) 10 mg tablet Take 1 tablet (10 mg) by mouth once daily at bedtime.       busPIRone (Buspar) 5 mg tablet Take by mouth 2 times a day.       donepezil (Aricept ODT) 10 mg disintegrating tablet Take 1 tablet (10 mg) by mouth once daily at bedtime.       famotidine  (Pepcid) 20 mg tablet Take by mouth.       levothyroxine (Synthroid, Levoxyl) 25 mcg tablet Take 1 tablet (25 mcg) by mouth early in the morning.. Take on an empty stomach at the same time each day, either 30 to 60 minutes prior to breakfast       losartan (Cozaar) 50 mg tablet Take 1 tablet (50 mg) by mouth once daily.       potassium chloride CR 10 mEq ER tablet Take 1 tablet (10 mEq) by mouth 2 times a day. Do not crush, chew, or split.       rivaroxaban (Xarelto) 20 mg tablet Take by mouth. Take with food.       sertraline (Zoloft) 25 mg tablet Take 1 tablet (25 mg) by mouth once daily.       sucralfate (Carafate) 100 mg/mL suspension Take 10 mL (1 g) by mouth 2 times a day.        Lisinopril and Shellfish derived  Social History     Tobacco Use    Smoking status: Never    Smokeless tobacco: Never   Substance Use Topics    Alcohol use: Never    Drug use: Never     No family history on file.    Review of Systems:  Confused, c/o being cold and of nausea.  Reports left thigh pain with palpation.      Objective   Vitals:  Most Recent:  Vitals:    09/05/24 1424   BP: (!) 119/94   Pulse: 72   Resp: 18   Temp: 35.4 °C (95.7 °F)   SpO2: (!) 0%       24hr Min/Max:  Temp  Min: 35.3 °C (95.5 °F)  Max: 35.5 °C (95.9 °F)  Pulse  Min: 40  Max: 78  BP  Min: 65/49  Max: 123/78  Resp  Min: 9  Max: 29  SpO2  Min: 0 %  Max: 100 %    I/O:  No intake/output data recorded.    LDA:  CVC 09/05/24 Triple lumen Right Internal jugular (Active)   Placement Date/Time: 09/05/24 1329   Hand Hygiene Performed Prior to CVC Insertion: Yes  Site Prep: Chlorhexidine   Site Prep Agent has Completely Dried Before Insertion: Yes  All 5 Sterile Barriers Used (Gloves, Gown, Cap, Mask, Large Sterile Drape):...   Number of days: 0       Urethral Catheter Non-latex 16 Fr. (Active)   Placement Date/Time: 09/05/24 1416   Placed by: Sofiya Tucker RN  Hand Hygiene Completed: Yes  Catheter Type: Non-latex  Tube Size (Fr.): 16 Fr.  Catheter Balloon Size: 10 mL   Urine Returned: Yes   Number of days: 0       Physical Exam:   - CONSTITUTION: critically ill   - NEUROLOGIC: oriented to name.  Distal CSM's preserved.    - CARDIOVASCULAR: SR/SB.  Cool extremities, distal LE pulses equal b/l.  Thready radial arteries to palp.    - RESPIRATORY: Unlabored, CTA   - GI: +BS, NT, ND   - : Lara draining yellow urine   - EXTREMITIES: no edema, PURCELL    - SKIN: cool, dry, turgor  - PSYCHIATRIC: calm, cooperative     Lab Review:  Results from last 7 days   Lab Units 09/05/24  1113   WBC AUTO x10*3/uL 8.8   HEMOGLOBIN g/dL 10.5*   HEMATOCRIT % 32.6*   PLATELETS AUTO x10*3/uL 181     Results from last 7 days   Lab Units 09/05/24  1113   SODIUM mmol/L 141   POTASSIUM mmol/L 3.7   CHLORIDE mmol/L 107   CO2 mmol/L 24   BUN mg/dL 26*   CREATININE mg/dL 1.40   CALCIUM mg/dL 8.5   PROTEIN TOTAL g/dL 5.4*   BILIRUBIN TOTAL mg/dL 0.8   ALK PHOS U/L 59   ALT U/L 18   AST U/L 24   GLUCOSE mg/dL 152*     Results from last 7 days   Lab Units 09/05/24  1113   MAGNESIUM mg/dL 2.00           Most recent labs and imaging reviewed.    Daily Risk Screen  Intubated: No   Central line: RIJ TLC 9/5  Lara: 9/5     Assessment/Plan   79 year old female with a history of CAD, hypertension, hyperlipidemia, PAF, dementia,  NSTEMI 3/10/24 with medically managed obstructive CAD to first diagonal branch, and recurrent PAF refractory to amio.  She underwent femoral accessed cardiac ablation 9/3/24 at [facility], was discharged to home, and developed acute hemorrhagic shock today with a latisha hematoma at insertion site of the ablation.  Her  reports a similar event following her previous ablation.       NEURO:  PMH of vascular dementia with nocturnal anxiety. She presented as oriented to name only, now with increased confusion in setting of acidosis.      - Serial neuro and pain assessments   - as needed Tylenol   - PT Consult, OOB to chair as tolerated,    - CAM ICU score qshift  - Sleep/wake cycle hygiene  - Resume  home buspar, donepezil, sertraline      CV:  Patient has a history of CAD, HLD, pAF s/p ablations x 3 most recently 2 days ago (9/3).  She presented with bradycardia and hypotension c/f cardiogenic shock 2/2 hemorrhagic shock in setting of proximal LAD/circ CAD (per chart review, no stents).  She remains on epi 0.1/kg/min despite volume resuscitation and dramatically drops her blood pressure when epi is weaned.  TTE in the ED demonstrated normal BiV finction on epi 0.01; dilated left atrium.    - Cont tele monitoring  - Maintain goal MAP 65-70  - Volume resuscitate as clinically indicated  - Wean epi as able   - Hold home losartan, b blocker   - Resume home statin      PULM:  H/O ASHU without PAP at home.  She is empirically on NC as SpO2's are difficult to obtain.    - Wean FiO2 maintaining SpO2 >92%.   - IS q1h and OOB to chair when able     GI:  PMH of GERD on H2 at home.  Nausea in setting of hypotension    - Continue home H2   - NPO pending possible OR  - Colace/senna BID and miralax BID     :  Baseline creatinine 1.0 now with pre-renal MAGGIE and mixed metabolic acidosis. Senior in place with low UO. -->  - Continue senior catheter for strict I/Os.  - BMP as clinically indicated     ENDO:  No PMH of DM, now with stress hyperglycemia    - Maintain BG <180, SSI      HEME:  Acute blood loss anemia now well resuscitated after 4 units pRBC and KCentra in setting of long term DOAC for AF.  - Q12 CBC   - SCDs for DVT prophylaxis, hold SQH   - Last type and screen: 9/5     ID:  Afebrile, no current indications of infection    - Trend temp q4h     Skin:  R thigh ecchymosis 2/2 hemorrhage.  No focal breakdown   - preventative Mepilex dressings in place on sacrum and heels  - change preventative Mepilex weekly or more frequently as indicated (when moist/soiled)   - every shift skin assessment per nursing and weekly ICU skin rounds  - moisture barrier to be applied with clara care  - active skin problems addressed with nursing  on daily rounds     Proph:  SCDs     G:  Line  Right IJ TLC 9/5  Unable to advance wire into either radial artery 2/2 stenosis     F: Family:  updated bedside.  He confirms his wife would not to be intubated or resuscitated in the event of cardiac arrest. He states that she would likely want to transition to comfort measures only if she does not rapidly improve.       CODE STATUS:  DNR CCA, no intubation.       A,B,C,D,E,F,G: reviewed    I personally spent 65 minutes of critical care time directly and personally managing the patient exclusive of separately billable procedures.     Dispo: ICU

## 2024-09-05 NOTE — CONSULTS
Reason For Consult  Left groin hematoma    History Of Present Illness  Maria De Jesus Castaneda is a 79 y.o. female presenting with a past medical history of atrial fibrillation.  Patient underwent cardiac ablation completed on September 3, 2024 at the [facility].  Patient  notes that she was in the bathroom approximately 45 minutes ago while at home and noted an expanding lump in the left groin/thigh.  She apparently had a syncopal episode while at home shortly after, EMS was called and she had another syncopal episode at that time.  She was noted to be hypotensive in the field and was transported to the hospital.  She has a rapidly expanding hematoma to the left groin/thigh.  She is currently hypotensive and bradycardic.  She was given atropine in the ER and her blood pressure improved slightly to a systolic blood pressure in the 90s.  On review of the patient's operative note, she had left femoral vein access for her cardiac ablation.  She reports moderate to severe pain with palpation of the hematoma.  She denies any motor or sensory dysfunction.  Of note, patient is on Xarelto.     Past Medical History  She has a past medical history of Atrial fibrillation (Multi), MI (mitral incompetence), and Vascular dementia (Multi).,  Hyperlipidemia, hypertension, coronary artery disease, obstructive sleep apnea, GERD, hypothyroidism, anxiety    Surgical History  She has a past surgical history that includes LASIK; Cataract extraction (Bilateral); Retinal detachment surgery (Left); Cholecystectomy; and Ankle fracture surgery (Left).     Social History  She reports that she has never smoked. She has never used smokeless tobacco. She reports that she does not drink alcohol and does not use drugs.    Family History  No family history on file.     Allergies  Lisinopril and Shellfish derived    Review of Systems    CONSTITUTIONAL: Denies weight loss, fever and chills.    HEENT: Denies changes in vision and  "hearing.    RESPIRATORY: Denies SOB and cough.    CV: Denies palpitations and CP.    GI: Denies abdominal pain, nausea, vomiting and diarrhea.    : Denies dysuria and urinary frequency.    MSK: Denies myalgia and joint pain.    SKIN: Denies rash and pruritus.    VASC: Denies claudication, ischemic rest pain, or open wounds or sores.  Positive for large left groin hematoma    NEUROLOGICAL: Denies headache.  Positive for syncopal episodes prior to coming into the hospital    PSYCHIATRIC: Denies recent changes in mood. Denies anxiety and depression.     Physical Exam  General: Awake, alert, answers questions appropriately.   at the bedside.  HEENT: Head is atraumatic, normocephalic.   Cardiac: Normal S1-S2.  Regular rate and rhythm.  No murmurs.  Respiratory: Lungs clear to auscultation.  No adventitious sounds.  Abdomen: Soft, nondistended, nontender.  Bowel sounds x4 quadrants.  Pulse exam: Palpable brachial and radial pulses bilaterall. She has palpable pedal pulses bilaterally.  Both feet are warm to the touch and normal in color.  Extremities: Patient has a very large hematoma in the left groin/medial thigh.  It is about the size of a watermelon.  There is ecchymosis noted as well.  We outlined the area that was firm with a skin marker.  There was no pulsatility to the hematoma.  Neuro: Moves all extremities spontaneously.  No focal deficits.  Psych: Appropriate affect.  Answers questions appropriately.     Last Recorded Vitals  Blood pressure 87/55, pulse 59, temperature 35.5 °C (95.9 °F), resp. rate 12, height 1.676 m (5' 6\"), weight 81.8 kg (180 lb 5.4 oz), SpO2 95%.    Relevant Results  Scheduled medications  iohexol, 75 mL, intravenous, Once in imaging  perflutren lipid microspheres, 0.5-10 mL of dilution, intravenous, Once in imaging      Continuous medications  EPINEPHrine, 0-0.2 mcg/kg/min      PRN medications       No results found.      Assessment/Plan   Left groin hematoma status post cardiac " ablation with venous access 9/3/2024  Anemia  Hypotension  Atrial fibrillation  Chronic anticoagulation    Patient was evaluated by myself as well as Dr. Adame in the emergency room.  She has a large hematoma present to the left groin and medial thigh.  According to the patient's , it has expanded its increased in size significantly since her transport to the hospital.  There is no pulsatility to suggest arterial bleed.  This is likely a venous bleed that will stop on its own as the pressure builds.  However, given that the patient is hypotensive and bradycardic, plan for stat CT angiogram abdomen and pelvis with runoff.  Agree with reversing DOAC.  Agree with giving blood.  If actively bleeding, may require hematoma evacuation with vessel repair emergently in the operating room.      I spent 65 minutes in the professional and overall care of this patient.      Abner Chen, APRN-CNP

## 2024-09-05 NOTE — SIGNIFICANT EVENT
Called to pt bed for pt becoming increasingly hypotensive as epinephrine was increasing.  Pt BP was 61/45 with a MAP of 52 on epinephrine at 0.19 mcg/kg/min when I arrived to pt bedside.  Pt spouse/decision maker Bradley Castaneda was present at bedside.  Discussed the current situation with Bradley and he has stated that pt has not wanted any heroic measures to be taken.  Pt code status has been changed to a DNR-comfort care at this time.  A morphine drip will be ordered for comfort and additional comfort care orders will be placed at this time.

## 2024-09-05 NOTE — ED PROVIDER NOTES
HPI   Chief Complaint   Patient presents with    Syncope     Patient bib ems for syncope, had an ablation bilateral groin 2 days ago.  Twenty minutes prior to arrival she began bleeding in her left groin.  Upon arrival she is diaphoretic, low bp and hr.       HPI  79-year-old female here by EMS for syncope, had cardiac ablation 2 days ago, is now having what looks like an expanding hematoma to the left proximal leg.  She is on blood thinning medications.  She has been taking Xarelto.  There is been no injury or trauma to the area but has started having episodes where she feels that she is passing out.  Bradycardic with hypotension, they indicated that she is always a little bit hypotensive however worse than usual reported by       Patient History   Past Medical History:   Diagnosis Date    Atrial fibrillation (Multi)     MI (mitral incompetence)     Vascular dementia (Multi)      Past Surgical History:   Procedure Laterality Date    ANKLE FRACTURE SURGERY Left     CATARACT EXTRACTION Bilateral     CHOLECYSTECTOMY      LASIK      RETINAL DETACHMENT SURGERY Left      No family history on file.  Social History     Tobacco Use    Smoking status: Never    Smokeless tobacco: Never   Substance Use Topics    Alcohol use: Never    Drug use: Never       Physical Exam   ED Triage Vitals   Temp Heart Rate Respirations BP   -- 09/05/24 1115 09/05/24 1115 09/05/24 1105    (!) 40 19 (!) 87/46      Pulse Ox Temp src Heart Rate Source Patient Position   09/05/24 1121 -- -- --   97 %         BP Location FiO2 (%)     -- --             Physical Exam  PHYSICAL EXAMINATION    GENERAL APPEARANCE: No obvious sign of injury or trauma with the exception of expanding hematoma left leg    VITAL SIGNS: As per the nurses' triage record.     HEENT: Normocephalic, atraumatic.    NECK: Soft Nontender and supple, full gross ROM, no meningeal signs.    CHEST: Nontender to palpation. Clear to auscultation bilaterally. No rales, rhonchi, or  wheezing.     HEART: S1, S2. Regular rate and rhythm. No murmurs, gallops or rubs.  Strong and equal pulses in the extremities.  Good pedal pulses bilateral.  Posterior tibial intact.    ABDOMEN: Soft, nontender, nondistended, positive bowel sounds, no palpable masses.    MUSCULOSKELETAL: The calves are nontender to palpation. Full gross active range of motion. Ambulating on own with no acute difficulties     NEUROLOGICAL: Awake, alert and oriented x 3. Power intact in the upper and lower extremities. Sensation is intact to light touch in the upper and lower extremities.     IMMUNOLOGICAL: No lymphatic streaking noted     DERM: No petechiae, rashes, or ecchymoses.    ED Course & MDM   ED Course as of 09/05/24 1323   Thu Sep 05, 2024   1125 Already made contact with vascular, they are coming to see the patient.  Diagnostics pending [AP]   1128 Emergent blood ordered and being prepared. [AP]   1133 Vascular team at ED bedside. [AP]   1145 Patient being prepped for CT transport, blood has been administered, additional blood ordered, atropine was given, improvement of heart rate marginally.  Vascular is still within the department to assist in planning [AP]   1159 Atropine was noted to have a significant improvement in the patient's heart rate going from the 40s to the high 70s and improvement in blood pressure to the high 90s systolic.  Case is discussed with cardiology personally who agreed with plan to provide epinephrine.  This is ordered with parameters for heart rate greater than 70.  Will however allow for permissive hypotension with goal of systolic .  Her blood pressure is still continuing to fluctuate we will provide another dose of emergent blood while crossmatch is being obtained.  Patient is taken to CT scan emergently and personally escorted.   remains at the bedside and is updated. [EF]   1205 Vascular surgeon is now at patient's bedside evaluating the condition [AP]   1206 Immediately after  the vascular surgeons assessment patient was escorted personally by ED attending to the CT department for imaging.  Patient just exited the ED department to the CT department [AP]   1221 Patient is being repositioned back in the room from the CT department. [AP]   1243 Recently just spoke to vascular surgeon as well as intensivist, no significant extravasation per the vascular surgeon, recommend ICU care.  ICU staff has excepted the patient under their services.  Art line being prepped at this time by ED attending.  Marginal improvement of blood pressure noted  [AP]      ED Course User Index  [AP] Kwame Ontiveros PA-C  [EF] Eneida Dozier, DO         Diagnoses as of 09/05/24 1323   Hypovolemic shock (Multi)   Hematoma                 No data recorded     [place] Coma Scale Score: 15 (09/05/24 1117 : Linda Michelle RN)                           Medical Decision Making  Parts of this chart have been completed using voice recognition software. Please excuse any errors of transcription.  My thought process and reason for plan has been formulated from the time that I saw the patient until the time of disposition and is not specific to one specific moment during their visit and furthermore my MDM encompasses this entire chart and not only this text box.      HPI: Detailed above.    Exam: A medically appropriate exam performed, outlined above, given the known history and presentation.    History Limited by: Nothing    History obtained from: The patient and     External/internal records reviewed: Reviewed ED visit from 8/1/2024    Social Determinants of Health considered during this visit: Lives at    Chronic conditions impacting care: Cardiac disease, on blood thinners    Medications given during visit:  Medications   EPINEPHrine (Adrenalin) 4 mg in dextrose 5% 250 mL (16 mcg/mL) infusion (premix) (0.1 mcg/kg/min × 81.8 kg intravenous Rate/Dose Change 9/5/24 1310)   perflutren lipid microspheres (Definity)  injection 0.5-10 mL of dilution (has no administration in time range)   sodium chloride 0.9 % bolus 1,000 mL (1,000 mL intravenous New Bag 9/5/24 1317)   lactated Ringer's bolus 1,000 mL (has no administration in time range)   four factor human prothrombin complex concentrate (Kcentra) 2,000 Units in sterile water 80 mL infusion (0 Units intravenous Stopped 9/5/24 1148)   sodium chloride 0.9 % bolus 1,000 mL (0 mL intravenous Stopped 9/5/24 1223)   atropine injection 0.5 mg (0.5 mg intravenous Given 9/5/24 1134)   iohexol (OMNIPaque) 350 mg iodine/mL solution 75 mL (75 mL intravenous Given 9/5/24 1220)   ketamine injection 20.5 mg (20.5 mg intravenous Given 9/5/24 1228)   ondansetron (Zofran) injection 4 mg (4 mg intravenous Given 9/5/24 1245)   prochlorperazine (Compazine) injection 10 mg (10 mg intravenous Given 9/5/24 1304)        Diagnostic/tests  Labs Reviewed   CBC WITH AUTO DIFFERENTIAL - Abnormal       Result Value    WBC 8.8      nRBC 0.0      RBC 3.26 (*)     Hemoglobin 10.5 (*)     Hematocrit 32.6 (*)           MCH 32.2      MCHC 32.2      RDW 14.4      Platelets 181      Neutrophils % 46.5      Immature Granulocytes %, Automated 0.3      Lymphocytes % 41.7      Monocytes % 10.5      Eosinophils % 0.5      Basophils % 0.5      Neutrophils Absolute 4.10      Immature Granulocytes Absolute, Automated 0.03      Lymphocytes Absolute 3.68 (*)     Monocytes Absolute 0.93 (*)     Eosinophils Absolute 0.04      Basophils Absolute 0.04     COMPREHENSIVE METABOLIC PANEL - Abnormal    Glucose 152 (*)     Sodium 141      Potassium 3.7      Chloride 107      Bicarbonate 24      Urea Nitrogen 26 (*)     Creatinine 1.40      eGFR 39 (*)     Calcium 8.5      Albumin 3.5      Alkaline Phosphatase 59      Total Protein 5.4 (*)     AST 24      Bilirubin, Total 0.8      ALT 18      Anion Gap 10     MAGNESIUM - Normal    Magnesium 2.00     TYPE AND SCREEN    ABO TYPE A      Rh TYPE POS      ANTIBODY SCREEN NEG      VERAB/VERIFY ABORH    ABO TYPE A      Rh TYPE POS     PREPARE RBC    PRODUCT CODE T7975O39      Unit Number [id number]      Unit ABO O      Unit RH NEG      Dispense Status TR      Blood Expiration Date 9/19/2024 11:59:00 PM EDT      PRODUCT BLOOD TYPE 9500      UNIT VOLUME 350      XM INTEP COMP     PREPARE RBC    PRODUCT CODE G7553O38      Unit Number [id number]      Unit ABO O      Unit RH NEG      Dispense Status TR      Blood Expiration Date 9/11/2024 11:59:00 PM EDT      PRODUCT BLOOD TYPE 9500      UNIT VOLUME 350      XM INTEP COMP     PREPARE RBC      Transthoracic Echo (TTE) Complete         CT angio aorta and bilateral iliofemoral runoff w and or wo IV contrast   Final Result   Large hematoma left upper thigh associated with arterial   injury/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        Non opacification of arterial system below mid thighs bilaterally,   presumed poor contrast bolus timing.                       Signed by: Fareed Reyes 9/5/2024 1:02 PM   Dictation workstation:   [id number]      XR chest 1 view    (Results Pending)        EKG: Obtained read by attending physician reviewed myself per my interpretation no sign of STEMI criteria    Case discussed with: Dr Adame (vascular), Dr Washington (cards), Dr Lima (ICU), ct department      Considerations/further MDM:  The patient has concern for rapidly expanding hematoma to the left leg likely partially related to the cardiac intervention that took place 2 days ago.  The patient remained awake and alert however had low blood pressure and generally did not feel well.  I had concern over a arterial hemorrhage.  The patient had a number of interventions and specialists involved over her ED visit.  Patient was seen and evaluated by vascular, a angiography study has been completed, the patient had received emergent blood transfusion for symptomatic hypotension and bradycardia as well as atropine and  other IV interventions for management of hypovolemic shock.  The patient CT does not show significant extravasation at the read of the vascular surgeon, they recommend ICU management.  Cardiology is also involved in the case as well.  The patient will be transition to ICU care.  Further differential includes aneurysm, ruptured vascular injury, sepsis, hypovolemia, acute blood loss, severe anemia, dehydration and electrolyte disturbance.  The patient will be closely monitored in the ICU for continued management of current symptoms.          Upon my evaluation, this patient had a high probability of imminent or life-threatening deterioration which required my direct attention, intervention, and personal management  I personally saw the patient and independently provided 50 minutes of non-concurrent critical care time were provided which excludes all other billable procedures.  This was for management of critical unstable vital signs requiring multiple parenteral interventions, blood transfusion, IV atropine management, consultation to cardiology, consultation to CT and CT technician, consultation to vascular surgeon, consultation to internist for ICU management, continued monitoring of airway breathing and circulation, initiation of ICU care management and continuous bedside monitoring and evaluation.      Procedure  Procedures     Kwmae Ontiveros PA-C  09/05/24 1249       Kwame Ontiveros PA-C  09/05/24 1323

## 2024-09-05 NOTE — ED TRIAGE NOTES
Patient bib ems for syncope, had an ablation bilateral groin 2 days ago.  Twenty minutes prior to arrival she began bleeding in her left groin.  Upon arrival she is diaphoretic, low bp and hr.

## 2024-09-06 NOTE — SIGNIFICANT EVENT
The patient passed away in the ICU.  On examination, unresponsive, apneic, pupils were nonreactive.  Heart sounds and peripheral pulses were absent.   The time of death was 9:29 PM on 9/5/2024

## 2024-09-06 NOTE — DISCHARGE SUMMARY
Discharge Diagnosis  Pt  at  on 2024.      Hospital Course   79 year old female with a history of CAD, hypertension, hyperlipidemia, PAF, dementia, status post NSTEMI 3/10 secondary occluded high rising first diagonal branch, who underwent femoral accessed cardiac ablation 9/3/24 at [facility] and was discharged to home.  She reported new thigh swelling to her  this morning; she then had syncope x 2 at home and he activated EMS who noted her to be bradycardiac, hypotensive, and with an obviously enlarging left thigh hematoma.  The hematoma continued to enalrge in the ED where she was given 2 units uncrossed units of pRBC as well as atropine and an epinephrine infusion for mixed shock (suspect myocardial hypoperfusion in setting of CAD and hemorrhagic shock).       Hemodynamics improved with blood and IVF in the ED.  She was evaluated by Vascular Surgery as well as Cardiology in the ED and was admitted to [facility], [place], operated by [facility] ICU for further resuscitation and management of primary hemorrhagic shock and secondary cariogenic shock.  Pt was given an additional 2 units of PRBCs and continued on the epinephrine drip, and was noted to have a repeat stable hemoglobin per lab results of 12.3.              Pt becoming increasingly hypotensive as epinephrine was increasing at approximately 1930.  Pt BP was 61/45 with a MAP of 52 on epinephrine at 0.19 mcg/kg/min and continuing to increase the epinephrine.  Pt spouse/decision maker Bradley Castaneda was present at bedside.  The current situation was discussed with Bradley and he has stated that pt has not wanted any heroic measures to be taken.  Pt code status has been changed to a DNR-comfort care at this time.      Family was present at the bedside at this time.  Pt was given 2mg IV push of morphine and started on a morphine drip for comfort.  Additional comfort care measures were placed at this time.  Pt was noted to be in PEA followed by asystole.  Pt was pronounced   at bedside by Dr. Gautam at 2129.      Pertinent Physical Exam At Time of Discharge  Pt unresponsive.  Pupils dilated and non-reactive.  Absent heart sounds.  Absent pulses.  Absent breath sounds.       Michael Rodriguez PA-C  Pulmonary and Critical Care Medicine   [facility]